# Patient Record
(demographics unavailable — no encounter records)

---

## 2025-03-24 NOTE — ADDENDUM
[FreeTextEntry1] : This note was written by Rashard Zavala on 03/24/2025 acting as scribe for Dr. Darryn Resendez M.D.  I, Darryn Resendez MD, have read and attest that all the information, medical decision making and discharge instructions within are true and accurate.

## 2025-03-24 NOTE — DISCUSSION/SUMMARY
[de-identified] : Lumbar disc degenerative disease. L5-S1 spondylolisthesis, autofused.  Left lumbar radiculopathy. Discussed all options. Diclofenac PRN. Referral for physical therapy. If no better in 2-3 weeks, will obtain MRI. All options discussed including rest, medicine, home exercise, acupuncture, Chiropractic care, Physical Therapy, Pain management, and last resort surgery. All questions were answered, all alternatives discussed, and the patient is in complete agreement with the treatment plan which the patient contributed to and discussed with me through the shared decision-making process. Follow-up appointment as instructed. Any issues and the patient will call or come in sooner.  agrees with plan.

## 2025-03-24 NOTE — HISTORY OF PRESENT ILLNESS
[Stable] : stable [de-identified] : 74 year old female presents for initial evaluation of left sided lower back pain x 1 week. She notes that she had a fall in December going to a work holiday party where she fell on her left side on her shoulder and knee. She notes that her knee and shoulder pain has improved. She notes that the pain radiates down the LLE posterolaterally to the knee. Denies numbness/tingling.  Denies weakness. Pain only occurs when she lays down.  She takes advil for the pain and has mild relief.  Has not tried PT or chiropractic care. PMHx: pre-DM, HLD, HTN, history of renal cell carcinoma s/p partial nephrectomy, osteoporosis on prolia  No fever, chills, sweats, nausea/vomiting. No bowel or bladder dysfunction, no recent weight loss or gain. No night pain. This history is in addition to the intake form that I personally reviewed.

## 2025-03-24 NOTE — PHYSICAL EXAM
[Normal] : Gait: normal [Briones's Sign] : negative Briones's sign [Pronator Drift] : negative pronator drift [SLR] : negative straight leg raise [de-identified] : 5 out of 5 motor strength, sensation is intact and symmetrical full range of motion flexion extension and rotation, no palpatory tenderness full range of motion of hips knees shoulders and elbows (all four extremities), no atrophy, negative straight leg raise, no pathological reflexes, no swelling, normal ambulation, no apparent distress skin is intact, no palpable lymph nodes, no upper or lower extremity instability, alert and oriented x3 and normal mood. Normal finger-to nose test.  No upper motor neuron findings. [de-identified] : XR AP Lat Lumbar 03/24/2025 -right uterine fibroid, disc degenerative disease, L5-S1 spondylolisthesis with autofusion- reviewed with patient.